# Patient Record
Sex: MALE | Race: WHITE | NOT HISPANIC OR LATINO | URBAN - METROPOLITAN AREA
[De-identification: names, ages, dates, MRNs, and addresses within clinical notes are randomized per-mention and may not be internally consistent; named-entity substitution may affect disease eponyms.]

---

## 2019-07-10 ENCOUNTER — INPATIENT (INPATIENT)
Facility: HOSPITAL | Age: 64
LOS: 0 days | Discharge: HOME | DRG: 287 | End: 2019-07-11
Attending: INTERNAL MEDICINE | Admitting: INTERNAL MEDICINE
Payer: COMMERCIAL

## 2019-07-10 ENCOUNTER — OUTPATIENT (OUTPATIENT)
Dept: OUTPATIENT SERVICES | Facility: HOSPITAL | Age: 64
LOS: 1 days | End: 2019-07-10
Payer: COMMERCIAL

## 2019-07-10 VITALS
DIASTOLIC BLOOD PRESSURE: 82 MMHG | SYSTOLIC BLOOD PRESSURE: 148 MMHG | TEMPERATURE: 98 F | HEART RATE: 61 BPM | OXYGEN SATURATION: 97 % | RESPIRATION RATE: 18 BRPM

## 2019-07-10 LAB
ALBUMIN SERPL ELPH-MCNC: 4.4 G/DL — SIGNIFICANT CHANGE UP (ref 3.3–5)
ALP SERPL-CCNC: 145 U/L — HIGH (ref 40–120)
ALT FLD-CCNC: 391 U/L — HIGH (ref 10–45)
ANION GAP SERPL CALC-SCNC: 10 MMOL/L — SIGNIFICANT CHANGE UP (ref 5–17)
APTT BLD: 30.3 SEC — SIGNIFICANT CHANGE UP (ref 27.5–36.3)
AST SERPL-CCNC: 254 U/L — HIGH (ref 10–40)
BASOPHILS # BLD AUTO: 0.02 K/UL — SIGNIFICANT CHANGE UP (ref 0–0.2)
BASOPHILS NFR BLD AUTO: 0.5 % — SIGNIFICANT CHANGE UP (ref 0–2)
BILIRUB SERPL-MCNC: 1.2 MG/DL — SIGNIFICANT CHANGE UP (ref 0.2–1.2)
BUN SERPL-MCNC: 20 MG/DL — SIGNIFICANT CHANGE UP (ref 7–23)
CALCIUM SERPL-MCNC: 9.3 MG/DL — SIGNIFICANT CHANGE UP (ref 8.4–10.5)
CHLORIDE SERPL-SCNC: 99 MMOL/L — SIGNIFICANT CHANGE UP (ref 96–108)
CK MB CFR SERPL CALC: 2.4 NG/ML — SIGNIFICANT CHANGE UP (ref 0–6.7)
CO2 SERPL-SCNC: 28 MMOL/L — SIGNIFICANT CHANGE UP (ref 22–31)
CREAT SERPL-MCNC: 1.25 MG/DL — SIGNIFICANT CHANGE UP (ref 0.5–1.3)
EOSINOPHIL # BLD AUTO: 0.15 K/UL — SIGNIFICANT CHANGE UP (ref 0–0.5)
EOSINOPHIL NFR BLD AUTO: 3.4 % — SIGNIFICANT CHANGE UP (ref 0–6)
EXTRA SST TUBE: SIGNIFICANT CHANGE UP
GLUCOSE SERPL-MCNC: 100 MG/DL — HIGH (ref 70–99)
HCT VFR BLD CALC: 42.9 % — SIGNIFICANT CHANGE UP (ref 39–50)
HGB BLD-MCNC: 14.3 G/DL — SIGNIFICANT CHANGE UP (ref 13–17)
IMM GRANULOCYTES NFR BLD AUTO: 0.2 % — SIGNIFICANT CHANGE UP (ref 0–1.5)
INR BLD: 1.1 — SIGNIFICANT CHANGE UP (ref 0.88–1.16)
LYMPHOCYTES # BLD AUTO: 1.03 K/UL — SIGNIFICANT CHANGE UP (ref 1–3.3)
LYMPHOCYTES # BLD AUTO: 23.3 % — SIGNIFICANT CHANGE UP (ref 13–44)
MCHC RBC-ENTMCNC: 32 PG — SIGNIFICANT CHANGE UP (ref 27–34)
MCHC RBC-ENTMCNC: 33.3 GM/DL — SIGNIFICANT CHANGE UP (ref 32–36)
MCV RBC AUTO: 96 FL — SIGNIFICANT CHANGE UP (ref 80–100)
MONOCYTES # BLD AUTO: 0.41 K/UL — SIGNIFICANT CHANGE UP (ref 0–0.9)
MONOCYTES NFR BLD AUTO: 9.3 % — SIGNIFICANT CHANGE UP (ref 2–14)
NEUTROPHILS # BLD AUTO: 2.81 K/UL — SIGNIFICANT CHANGE UP (ref 1.8–7.4)
NEUTROPHILS NFR BLD AUTO: 63.3 % — SIGNIFICANT CHANGE UP (ref 43–77)
NRBC # BLD: 0 /100 WBCS — SIGNIFICANT CHANGE UP (ref 0–0)
PLATELET # BLD AUTO: 171 K/UL — SIGNIFICANT CHANGE UP (ref 150–400)
POTASSIUM SERPL-MCNC: 4.1 MMOL/L — SIGNIFICANT CHANGE UP (ref 3.5–5.3)
POTASSIUM SERPL-SCNC: 4.1 MMOL/L — SIGNIFICANT CHANGE UP (ref 3.5–5.3)
PROT SERPL-MCNC: 7.4 G/DL — SIGNIFICANT CHANGE UP (ref 6–8.3)
PROTHROM AB SERPL-ACNC: 12.5 SEC — SIGNIFICANT CHANGE UP (ref 10–12.9)
RBC # BLD: 4.47 M/UL — SIGNIFICANT CHANGE UP (ref 4.2–5.8)
RBC # FLD: 12.7 % — SIGNIFICANT CHANGE UP (ref 10.3–14.5)
SODIUM SERPL-SCNC: 137 MMOL/L — SIGNIFICANT CHANGE UP (ref 135–145)
TROPONIN T SERPL-MCNC: <0.01 NG/ML — SIGNIFICANT CHANGE UP (ref 0–0.01)
WBC # BLD: 4.43 K/UL — SIGNIFICANT CHANGE UP (ref 3.8–10.5)
WBC # FLD AUTO: 4.43 K/UL — SIGNIFICANT CHANGE UP (ref 3.8–10.5)

## 2019-07-10 PROCEDURE — 93571 IV DOP VEL&/PRESS C FLO 1ST: CPT | Mod: 26,LD

## 2019-07-10 PROCEDURE — 93572 IV DOP VEL&/PRESS C FLO EA: CPT | Mod: 26,RC

## 2019-07-10 PROCEDURE — 99291 CRITICAL CARE FIRST HOUR: CPT

## 2019-07-10 PROCEDURE — 75574 CT ANGIO HRT W/3D IMAGE: CPT | Mod: 26

## 2019-07-10 PROCEDURE — 93458 L HRT ARTERY/VENTRICLE ANGIO: CPT | Mod: 26

## 2019-07-10 RX ORDER — ASPIRIN/CALCIUM CARB/MAGNESIUM 324 MG
81 TABLET ORAL DAILY
Refills: 0 | Status: DISCONTINUED | OUTPATIENT
Start: 2019-07-10 | End: 2019-07-11

## 2019-07-10 RX ORDER — SODIUM CHLORIDE 9 MG/ML
1000 INJECTION INTRAMUSCULAR; INTRAVENOUS; SUBCUTANEOUS ONCE
Refills: 0 | Status: COMPLETED | OUTPATIENT
Start: 2019-07-10 | End: 2019-07-10

## 2019-07-10 RX ORDER — COLCHICINE 0.6 MG
1 TABLET ORAL
Qty: 0 | Refills: 0 | DISCHARGE

## 2019-07-10 RX ORDER — SODIUM CHLORIDE 9 MG/ML
500 INJECTION INTRAMUSCULAR; INTRAVENOUS; SUBCUTANEOUS
Refills: 0 | Status: DISCONTINUED | OUTPATIENT
Start: 2019-07-10 | End: 2019-07-11

## 2019-07-10 RX ORDER — CLOPIDOGREL BISULFATE 75 MG/1
600 TABLET, FILM COATED ORAL ONCE
Refills: 0 | Status: DISCONTINUED | OUTPATIENT
Start: 2019-07-10 | End: 2019-07-10

## 2019-07-10 RX ORDER — COLCHICINE 0.6 MG
0.6 TABLET ORAL DAILY
Refills: 0 | Status: DISCONTINUED | OUTPATIENT
Start: 2019-07-10 | End: 2019-07-11

## 2019-07-10 RX ORDER — ASPIRIN/CALCIUM CARB/MAGNESIUM 324 MG
324 TABLET ORAL ONCE
Refills: 0 | Status: COMPLETED | OUTPATIENT
Start: 2019-07-10 | End: 2019-07-10

## 2019-07-10 RX ORDER — SODIUM CHLORIDE 9 MG/ML
500 INJECTION INTRAMUSCULAR; INTRAVENOUS; SUBCUTANEOUS
Refills: 0 | Status: DISCONTINUED | OUTPATIENT
Start: 2019-07-10 | End: 2019-07-25

## 2019-07-10 RX ORDER — ATORVASTATIN CALCIUM 80 MG/1
40 TABLET, FILM COATED ORAL AT BEDTIME
Refills: 0 | Status: DISCONTINUED | OUTPATIENT
Start: 2019-07-10 | End: 2019-07-11

## 2019-07-10 RX ORDER — LOSARTAN POTASSIUM 100 MG/1
25 TABLET, FILM COATED ORAL DAILY
Refills: 0 | Status: DISCONTINUED | OUTPATIENT
Start: 2019-07-10 | End: 2019-07-11

## 2019-07-10 RX ADMIN — Medication 0.6 MILLIGRAM(S): at 22:49

## 2019-07-10 RX ADMIN — ATORVASTATIN CALCIUM 40 MILLIGRAM(S): 80 TABLET, FILM COATED ORAL at 22:49

## 2019-07-10 RX ADMIN — SODIUM CHLORIDE 75 MILLILITER(S): 9 INJECTION INTRAMUSCULAR; INTRAVENOUS; SUBCUTANEOUS at 21:39

## 2019-07-10 RX ADMIN — Medication 324 MILLIGRAM(S): at 16:45

## 2019-07-10 RX ADMIN — SODIUM CHLORIDE 1000 MILLILITER(S): 9 INJECTION INTRAMUSCULAR; INTRAVENOUS; SUBCUTANEOUS at 16:48

## 2019-07-10 NOTE — ED ADULT NURSE NOTE - CHPI ED NUR SYMPTOMS NEG
no chills/no vomiting/no chest pain/no diaphoresis/no back pain/no nausea/no congestion/no syncope/no dizziness/no fever/no shortness of breath

## 2019-07-10 NOTE — H&P ADULT - ASSESSMENT
62yo male with PMHx of HTN, Gout who presented to [facility] ED from [facility] after an abnormal study and admitted for Cath revealing non obstructed CAD  - admit for hydration  - Start ASA/Statin  - discharge in AM.

## 2019-07-10 NOTE — ED ADULT NURSE NOTE - OBJECTIVE STATEMENT
pt arrived to ED from outpatient clinic, A&O x 3. Sent from outpatient cardiology d/t abnormal CT and EKG findings. hx HTN, Gout, bilat knee replacement. pt currently denies pain, SOB, n/v/d/ fever. NAD noted at this time. Tele monitoring applied. EKG done at triage.

## 2019-07-10 NOTE — PROGRESS NOTE ADULT - SUBJECTIVE AND OBJECTIVE BOX
PROCEDURE: CORONARY ANGIOGRAM, LHC, LV gram and IFR  INDICATION: unstable angina, positive CTA  REFERRING: Dr. Gao  ATTENDING: KENNETH  ACCESS: RRA    FINDINGS:   mid RCA: 60%  (IFR=1)   Left Main= no significant stenosis, minimally calcific  Lcx: large vessel, luminal irregularities  ostial LAD= 50% (IFR of LAD = 0.95 with wire in distal segment, measurement repeated twice)  mid-distal LAD= luminal irregularities   LVEF=65%   LVEDP= 19 mmHg    A/P  -non obstructive CAD  -cont with medical therapy

## 2019-07-10 NOTE — H&P ADULT - HISTORY OF PRESENT ILLNESS
Patient brought directly from ED to Cath lab.  History and physical exam performed after cath procedure    64yo male with PMHx of HTN, Gout who presented to [facility] ED from [facility] after an abnormal study.  Patient originally presented to dr. Parks's office with c/o two episodes of midsternal chest discomfort that awoke him from sleep.  Denies any SOB, dizziness, syncope, LE swelling, orthopnea, fevers, chills.  Patient was referred to [facility] for outpatient CTA Coronaries which revealed calcium score is severe at 633  Agatston units, which is at the 88th percentile, adjusted for age, gender and race, Severe stenosis of ostial LAD, Borderline obstructive distal LM disease, Dense calcific plaque at the ostium of D1. Significant stenosis cannot be excluded, Motion artifact in mid RCA, significant stenosis cannot be excluded, Non obstructive coronary disease elsewhere.  Patient sent from [facility] to [facility] ED given abnormal results.  He remained chest pain free.  Cardiac enzyme negative x1.  Patient admitted under Dr. Parks and brought directly to the cardiac cath lab.  He is s/p Diagnostic Cardiac Cath revealing     mid RCA: 60%  (IFR=1)   Left Main= no significant stenosis, minimally calcific  Lcx: large vessel, luminal irregularities  ostial LAD= 50% (IFR of LAD = 0.95 with wire in distal segment, measurement repeated twice)  mid-distal LAD= luminal irregularities   LVEF=65%   LVEDP= 19 mmHg Patient brought directly from ED to Cath lab.  History and physical exam performed after cath procedure    62yo male with PMHx of HTN, Gout who presented to [facility] ED from [facility] after an abnormal study.  Patient originally presented to dr. Parks's office with c/o two episodes of midsternal chest discomfort that awoke him from sleep.  Denies any SOB, dizziness, syncope, LE swelling, orthopnea, fevers, chills.  Patient was referred to [facility] for outpatient CTA Coronaries which revealed calcium score is severe at 633  Agatston units, which is at the 88th percentile, adjusted for age, gender and race, Severe stenosis of ostial LAD, Borderline obstructive distal LM disease, Dense calcific plaque at the ostium of D1. Significant stenosis cannot be excluded, Motion artifact in mid RCA, significant stenosis cannot be excluded, Non obstructive coronary disease elsewhere.  Patient sent from [facility] to [facility] ED given abnormal results.  He remained chest pain free.  Cardiac enzyme negative x1.  Patient admitted under Dr. Parks and brought directly to the cardiac cath lab.  He is s/p Diagnostic Cardiac Cath revealing non obstructed CAD (mid RCA: 60%  (IFR=1), Left Main= no significant stenosis, minimally calcific, Lcx: large vessel, luminal irregularities, ostial LAD= 50% (IFR of LAD = 0.95 with wire in distal segment, measurement repeated twice), mid-distal LAD= luminal irregularities, LVEF=65%, LVEDP= 19 mmHg.  Patient admitted post procedure for hydration given double dye load.

## 2019-07-10 NOTE — ED PROVIDER NOTE - CLINICAL SUMMARY MEDICAL DECISION MAKING FREE TEXT BOX
here w/ severe LAD stenosis in setting of 2 episodes of concerning chest pain, likely unstable angina. ekg repeated here - no STEMI. loaded w/ aspirin and plavix, for cath today

## 2019-07-10 NOTE — ED ADULT NURSE NOTE - RESPIRATORY WDL
no
Breathing spontaneous and unlabored. Breath sounds clear and equal bilaterally with regular rhythm.

## 2019-07-11 ENCOUNTER — TRANSCRIPTION ENCOUNTER (OUTPATIENT)
Age: 64
End: 2019-07-11

## 2019-07-11 VITALS — DIASTOLIC BLOOD PRESSURE: 79 MMHG | HEART RATE: 69 BPM | SYSTOLIC BLOOD PRESSURE: 129 MMHG | RESPIRATION RATE: 18 BRPM

## 2019-07-11 LAB
ALBUMIN SERPL ELPH-MCNC: 3.6 G/DL — SIGNIFICANT CHANGE UP (ref 3.3–5)
ALP SERPL-CCNC: 114 U/L — SIGNIFICANT CHANGE UP (ref 40–120)
ALT FLD-CCNC: 253 U/L — HIGH (ref 10–45)
ANION GAP SERPL CALC-SCNC: 9 MMOL/L — SIGNIFICANT CHANGE UP (ref 5–17)
AST SERPL-CCNC: 138 U/L — HIGH (ref 10–40)
BILIRUB DIRECT SERPL-MCNC: <0.2 MG/DL — SIGNIFICANT CHANGE UP (ref 0–0.2)
BILIRUB INDIRECT FLD-MCNC: >0.4 MG/DL — SIGNIFICANT CHANGE UP (ref 0.2–1)
BILIRUB SERPL-MCNC: 0.6 MG/DL — SIGNIFICANT CHANGE UP (ref 0.2–1.2)
BUN SERPL-MCNC: 16 MG/DL — SIGNIFICANT CHANGE UP (ref 7–23)
CALCIUM SERPL-MCNC: 8.7 MG/DL — SIGNIFICANT CHANGE UP (ref 8.4–10.5)
CHLORIDE SERPL-SCNC: 104 MMOL/L — SIGNIFICANT CHANGE UP (ref 96–108)
CHOLEST SERPL-MCNC: 149 MG/DL — SIGNIFICANT CHANGE UP (ref 10–199)
CO2 SERPL-SCNC: 26 MMOL/L — SIGNIFICANT CHANGE UP (ref 22–31)
CREAT SERPL-MCNC: 1.11 MG/DL — SIGNIFICANT CHANGE UP (ref 0.5–1.3)
GLUCOSE SERPL-MCNC: 87 MG/DL — SIGNIFICANT CHANGE UP (ref 70–99)
HBA1C BLD-MCNC: 5.3 % — SIGNIFICANT CHANGE UP (ref 4–5.6)
HCT VFR BLD CALC: 39 % — SIGNIFICANT CHANGE UP (ref 39–50)
HCV AB S/CO SERPL IA: 0.1 S/CO — SIGNIFICANT CHANGE UP
HCV AB SERPL-IMP: SIGNIFICANT CHANGE UP
HDLC SERPL-MCNC: 37 MG/DL — LOW
HGB BLD-MCNC: 12.8 G/DL — LOW (ref 13–17)
LIPID PNL WITH DIRECT LDL SERPL: 92 MG/DL — SIGNIFICANT CHANGE UP
MAGNESIUM SERPL-MCNC: 2.1 MG/DL — SIGNIFICANT CHANGE UP (ref 1.6–2.6)
MCHC RBC-ENTMCNC: 31.8 PG — SIGNIFICANT CHANGE UP (ref 27–34)
MCHC RBC-ENTMCNC: 32.8 GM/DL — SIGNIFICANT CHANGE UP (ref 32–36)
MCV RBC AUTO: 96.8 FL — SIGNIFICANT CHANGE UP (ref 80–100)
NRBC # BLD: 0 /100 WBCS — SIGNIFICANT CHANGE UP (ref 0–0)
PLATELET # BLD AUTO: 149 K/UL — LOW (ref 150–400)
POTASSIUM SERPL-MCNC: 4.2 MMOL/L — SIGNIFICANT CHANGE UP (ref 3.5–5.3)
POTASSIUM SERPL-SCNC: 4.2 MMOL/L — SIGNIFICANT CHANGE UP (ref 3.5–5.3)
PROT SERPL-MCNC: 6.2 G/DL — SIGNIFICANT CHANGE UP (ref 6–8.3)
RBC # BLD: 4.03 M/UL — LOW (ref 4.2–5.8)
RBC # FLD: 12.9 % — SIGNIFICANT CHANGE UP (ref 10.3–14.5)
SODIUM SERPL-SCNC: 139 MMOL/L — SIGNIFICANT CHANGE UP (ref 135–145)
TOTAL CHOLESTEROL/HDL RATIO MEASUREMENT: 4 RATIO — SIGNIFICANT CHANGE UP (ref 3.4–9.6)
TRIGL SERPL-MCNC: 102 MG/DL — SIGNIFICANT CHANGE UP (ref 10–149)
WBC # BLD: 3.35 K/UL — LOW (ref 3.8–10.5)
WBC # FLD AUTO: 3.35 K/UL — LOW (ref 3.8–10.5)

## 2019-07-11 PROCEDURE — 75574 CT ANGIO HRT W/3D IMAGE: CPT

## 2019-07-11 PROCEDURE — 76705 ECHO EXAM OF ABDOMEN: CPT | Mod: 26

## 2019-07-11 RX ORDER — LOSARTAN POTASSIUM 100 MG/1
1 TABLET, FILM COATED ORAL
Qty: 30 | Refills: 2
Start: 2019-07-11 | End: 2019-10-08

## 2019-07-11 RX ORDER — OLMESARTAN MEDOXOMIL 5 MG/1
0 TABLET, FILM COATED ORAL
Qty: 0 | Refills: 0 | DISCHARGE

## 2019-07-11 RX ORDER — ASPIRIN/CALCIUM CARB/MAGNESIUM 324 MG
1 TABLET ORAL
Qty: 0 | Refills: 0 | DISCHARGE
Start: 2019-07-11

## 2019-07-11 RX ADMIN — Medication 81 MILLIGRAM(S): at 12:16

## 2019-07-11 NOTE — DISCHARGE NOTE NURSING/CASE MANAGEMENT/SOCIAL WORK - NSDCDPATPORTLINK_GEN_ALL_CORE
You can access the [facility] Portal, offered by [facility], by registering with the following website: [email or web address]

## 2019-07-11 NOTE — PROGRESS NOTE ADULT - SUBJECTIVE AND OBJECTIVE BOX
HPI:  Patient brought directly from ED to Cath lab.  History and physical exam performed after cath procedure    64yo male with PMHx of HTN, Gout who presented to [facility] ED from [facility] after an abnormal study.  Patient originally presented to dr. Parks's office with c/o two episodes of midsternal chest discomfort that awoke him from sleep.  Denies any SOB, dizziness, syncope, LE swelling, orthopnea, fevers, chills.  Patient was referred to [facility] for outpatient CTA Coronaries which revealed calcium score is severe at 633  Agatston units, which is at the 88th percentile, adjusted for age, gender and race, Severe stenosis of ostial LAD, Borderline obstructive distal LM disease, Dense calcific plaque at the ostium of D1. Significant stenosis cannot be excluded, Motion artifact in mid RCA, significant stenosis cannot be excluded, Non obstructive coronary disease elsewhere.  Patient sent from [facility] to [facility] ED given abnormal results.  He remained chest pain free.  Cardiac enzyme negative x1.  Patient admitted under Dr. Parks and brought directly to the cardiac cath lab.  He is s/p Diagnostic Cardiac Cath revealing non obstructed CAD (mid RCA: 60%  (IFR=1), Left Main= no significant stenosis, minimally calcific, Lcx: large vessel, luminal irregularities, ostial LAD= 50% (IFR of LAD = 0.95 with wire in distal segment, measurement repeated twice), mid-distal LAD= luminal irregularities, LVEF=65%, LVEDP= 19 mmHg.  Patient admitted post procedure for hydration given double dye load. (10 Jul 2019 19:55)        MEDICATIONS  (STANDING):  aspirin enteric coated 81 milliGRAM(s) Oral daily  atorvastatin 40 milliGRAM(s) Oral at bedtime  colchicine 0.6 milliGRAM(s) Oral daily  losartan 25 milliGRAM(s) Oral daily  sodium chloride 0.9%. 500 milliLiter(s) (75 mL/Hr) IV Continuous <Continuous>        PAST MEDICAL & SURGICAL HISTORY:  No significant past surgical history        REVIEW OF SYSTEMS      General:	    Skin/Breast:  	  Ophthalmologic:  	  ENMT:	    Respiratory and Thorax:  	  Cardiovascular:	    Gastrointestinal:	    Genitourinary:	    Musculoskeletal:	    Neurological:	    Psychiatric:	    Hematology/Lymphatics:	    Endocrine:	    Allergic/Immunologic:	    FAMILY HISTORY:      Allergies    No Known Allergies    Intolerances          Vital Signs Last 24 Hrs  T(C): 36.6 (11 Jul 2019 09:14), Max: 36.9 (10 Jul 2019 16:09)  T(F): 97.8 (11 Jul 2019 09:14), Max: 98.4 (10 Jul 2019 16:09)  HR: 63 (11 Jul 2019 08:44) (48 - 63)  BP: 137/67 (11 Jul 2019 08:44) (116/67 - 148/82)  BP(mean): --  RR: 16 (11 Jul 2019 08:44) (16 - 18)  SpO2: 95% (11 Jul 2019 08:44) (95% - 97%)      PHYSICAL EXAM:      Constitutional:    Eyes:    ENMT:    Neck:    Breasts:    Back:    Respiratory:    Cardiovascular:  s1 s2  Gastrointestinal:    Genitourinary:    Rectal:    Extremities:    Vascular:    Neurological:    Skin:    Lymph Nodes:    Musculoskeletal:    Psychiatric:          CARDIAC MARKERS ( 10 Jul 2019 16:28 )  x     / <0.01 ng/mL / 98 U/L / x     / 2.4 ng/mL          PT/INR - ( 10 Jul 2019 16:28 )   PT: 12.5 sec;   INR: 1.10          PTT - ( 10 Jul 2019 16:28 )  PTT:30.3 sec      Vital Signs Last 24 Hrs  T(C): 36.6 (11 Jul 2019 09:14), Max: 36.9 (10 Jul 2019 16:09)  T(F): 97.8 (11 Jul 2019 09:14), Max: 98.4 (10 Jul 2019 16:09)  HR: 63 (11 Jul 2019 08:44) (48 - 63)  BP: 137/67 (11 Jul 2019 08:44) (116/67 - 148/82)  BP(mean): --  RR: 16 (11 Jul 2019 08:44) (16 - 18)  SpO2: 95% (11 Jul 2019 08:44) (95% - 97%)    HEALTH ISSUES - PROBLEM Dx:  cad non obstructive prox lad by ffr .95 and rca lesions medical mangement  lfts abnormalities pt had mild baseline lfts abnormalities that have risen in two days hepatology consult d/w dr merlos who referred him will have Dr shen see pt pt had ultrasound at [facility] read as normal hold statin for now

## 2019-07-11 NOTE — DISCHARGE NOTE PROVIDER - CARE PROVIDERS DIRECT ADDRESSES
,[email or web address] ,[email or web address]

## 2019-07-11 NOTE — CONSULT NOTE ADULT - ASSESSMENT
transaminitis: hep serologies negative, NAFLD  transaminases better than initial  suggest further workup as out patient

## 2019-07-11 NOTE — CONSULT NOTE ADULT - SUBJECTIVE AND OBJECTIVE BOX
62yo male with PMHx of HTN, Gout who presented to [facility] ED from [facility] after an abnormal study. Called because of LFTs.  Denies history of LFTs. Quit alcohol many years ago.  not on statins nor any herbs  3 days ago c/o epigastric pain relieved by antacids recurred again and went to [facility]  labs and sono done, Hetrpgenous liver.       REVIEW OF SYSTEMS:  Constitutional: No fever, weight loss or fatigue  ENMT:  No difficulty hearing, tinnitus, vertigo; No sinus or throat pain  Respiratory: No cough, wheezing, chills or hemoptysis  Cardiovascular: No chest pain, palpitations, dizziness or leg swelling  Gastrointestinal: No abdominal or epigastric pain. No nausea, vomiting or hematemesis; No diarrhea or constipation. No melena or hematochezia.  Skin: No itching, burning, rashes or lesions   Musculoskeletal: No joint pain or swelling; No muscle, back or extremity pain    PAST MEDICAL & SURGICAL HISTORY:  No significant past surgical history      FAMILY HISTORY:      SOCIAL HISTORY:  Smoking Status: [ ] Current, [ ] Former, [ ] Never  Pack Years:    MEDICATIONS:  MEDICATIONS  (STANDING):  aspirin enteric coated 81 milliGRAM(s) Oral daily  colchicine 0.6 milliGRAM(s) Oral daily  losartan 25 milliGRAM(s) Oral daily  sodium chloride 0.9%. 500 milliLiter(s) (75 mL/Hr) IV Continuous <Continuous>    MEDICATIONS  (PRN):      Allergies    No Known Allergies    Intolerances        Vital Signs Last 24 Hrs  T(C): 36.6 (11 Jul 2019 09:14), Max: 36.9 (10 Jul 2019 16:09)  T(F): 97.8 (11 Jul 2019 09:14), Max: 98.4 (10 Jul 2019 16:09)  HR: 57 (11 Jul 2019 08:44) (48 - 62)  BP: 137/67 (11 Jul 2019 08:44) (116/67 - 148/82)  BP(mean): --  RR: 16 (11 Jul 2019 08:44) (16 - 18)  SpO2: 95% (11 Jul 2019 08:44) (95% - 97%)    07-10 @ 07:01  -  07-11 @ 07:00  --------------------------------------------------------  IN: 375 mL / OUT: 0 mL / NET: 375 mL    07-11 @ 07:01  -  07-11 @ 12:31  --------------------------------------------------------  IN: 180 mL / OUT: 0 mL / NET: 180 mL          PHYSICAL EXAM:    General: Well developed; well nourished; in no acute distress  HEENT: MMM, conjunctiva and sclera clear  Gastrointestinal: Soft, non-tender non-distended; Normal bowel sounds; No rebound or guarding  Extremities: Normal range of motion, No clubbing, cyanosis or edema  Neurological: Alert and oriented x3  Skin: Warm and dry. No obvious rash      LABS:                        12.8   3.35  )-----------( 149      ( 11 Jul 2019 06:03 )             39.0     07-11    139  |  104  |  16  ----------------------------<  87  4.2   |  26  |  1.11    Ca    8.7      11 Jul 2019 06:03  Mg     2.1     07-11    TPro  6.2  /  Alb  3.6  /  TBili  0.6  /  DBili  <0.2  /  AST  138<H>  /  ALT  253<H>  /  AlkPhos  114  07-11          RADIOLOGY & ADDITIONAL STUDIES:

## 2019-07-11 NOTE — DISCHARGE NOTE PROVIDER - NSDCFUADDINST_GEN_ALL_CORE_FT
•	Your procedure was done through your  wrist  •	You do not need to keep this area covered and you may shower  •	Please avoid any heavy lifting  (no more than 3 to 5 lbs) or strenuous activity for five days  •	If you develop any swelling, bleeding, hardening of the skin (hematoma formation), acute pain, numbness/tingling  in your arm  please contact your doctor immediately or call our 24/7 line: [phone]

## 2019-07-11 NOTE — DISCHARGE NOTE PROVIDER - HOSPITAL COURSE
62yo male with PMHx of HTN, Gout who presented to [facility] ED 7/10/19 from [facility] after an abnormal study.  Patient originally presented to dr. Parks's office with c/o two episodes of midsternal chest discomfort that awoke him from sleep.  Patient was referred to [facility] for outpatient CTA Coronaries which revealed calcium score is severe at 633  Agatston units, which is at the 88th percentile, adjusted for age, gender and race, Severe stenosis of ostial LAD, Borderline obstructive distal LM disease, Dense calcific plaque at the ostium of D1. Significant stenosis cannot be excluded, Motion artifact in mid RCA, significant stenosis cannot be excluded, Non obstructive coronary disease elsewhere.  Patient sent from [facility] to [facility] ED given abnormal results.  He remained chest pain free.  Cardiac enzyme negative x1.  Patient admitted under Dr. Parks and brought directly to the cardiac cath lab.  He is s/p Diagnostic Cardiac Cath (7/10/19) revealing non obstructed CAD (mid RCA: 60%  (IFR=1), Left Main= no significant stenosis, minimally calcific, Lcx: large vessel, luminal irregularities, ostial LAD= 50% (IFR of LAD = 0.95 with wire in distal segment, measurement repeated twice), mid-distal LAD= luminal irregularities, LVEF=65%, LVEDP= 19 mmHg.  Patient was admitted post procedure for IV hydration in the setting of double dye load within 24 hours.         Patient seen and examined at bedside this morning. Patient with no complaints and is out of bed ambulating. Right radial access stable: no hematoma, no bleed, 2+ radial pulse. Lab values reviewed this AM and remain stable including renal function. Vital signs and telemetry remain stable overnight. Discharge medication reviewed with Dr. Parks with plan to discharge patient home on ASA 81 mg daily, Lipitor 40 mg daily (LFTs reviewed with Dr. Parks—PENDING), Losartan 25 mg daily. No BB therapy secondary to bradycardia. Patient has been cleared for discharge and will follow up with Dr. Parks in 1 week for a check up. 62yo male with PMHx of HTN, Gout who presented to [facility] ED 7/10/19 from [facility] after an abnormal study.  Patient originally presented to dr. Parks's office with c/o two episodes of midsternal chest discomfort that awoke him from sleep.  Patient was referred to [facility] for outpatient CTA Coronaries which revealed calcium score is severe at 633  Agatston units, which is at the 88th percentile, adjusted for age, gender and race, Severe stenosis of ostial LAD, Borderline obstructive distal LM disease, Dense calcific plaque at the ostium of D1. Significant stenosis cannot be excluded, Motion artifact in mid RCA, significant stenosis cannot be excluded, Non obstructive coronary disease elsewhere.  Patient sent from [facility] to [facility] ED given abnormal results.  He remained chest pain free.  Cardiac enzyme negative x1.  Patient admitted under Dr. Parks and brought directly to the cardiac cath lab.  He is s/p Diagnostic Cardiac Cath (7/10/19) revealing non obstructed CAD (mid RCA: 60%  (IFR=1), Left Main= no significant stenosis, minimally calcific, Lcx: large vessel, luminal irregularities, ostial LAD= 50% (IFR of LAD = 0.95 with wire in distal segment, measurement repeated twice), mid-distal LAD= luminal irregularities, LVEF=65%, LVEDP= 19 mmHg.  Patient was admitted post procedure for IV hydration in the setting of double dye load within 24 hours.         Patient seen and examined at bedside this morning. Patient with no complaints and is out of bed ambulating. Right radial access stable: no hematoma, no bleed, 2+ radial pulse. Lab values reviewed this AM and patient noted to have transaminitis for which Dr. Guevara was consulted. Patient had RUQ US performed at [facility] 7/8/19 due to elevated LFTs consistent with heterogenous liver. Dr. Guevara recommended repeat RUQ US today and preliminary read with no acute findings, hepatitic steotosis. Dr. Guevara recommends deferring initiation of statin therapy until LFTs normalize.     Vital signs and telemetry remain stable overnight. Discharge medication reviewed with Dr. Parks with plan to discharge patient home on ASA 81 mg daily, Lipitor 40 mg daily (LFTs reviewed with Dr. Parks—PENDING), Losartan 25 mg daily. No BB therapy secondary to bradycardia. Patient has been cleared for discharge and will follow up with Dr. Parks in 1 week for a check up. 62yo male with PMHx of HTN, Gout who presented to [facility] ED 7/10/19 from [facility] after an abnormal study.  Patient originally presented to dr. Parks's office with c/o two episodes of midsternal chest discomfort that awoke him from sleep.  Patient was referred to [facility] for outpatient CTA Coronaries which revealed calcium score is severe at 633  Agatston units, which is at the 88th percentile, adjusted for age, gender and race, Severe stenosis of ostial LAD, Borderline obstructive distal LM disease, Dense calcific plaque at the ostium of D1. Significant stenosis cannot be excluded, Motion artifact in mid RCA, significant stenosis cannot be excluded, Non obstructive coronary disease elsewhere.  Patient sent from [facility] to [facility] ED given abnormal results.  He remained chest pain free.  Cardiac enzyme negative x1.  Patient admitted under Dr. Parks and brought directly to the cardiac cath lab.  He is s/p Diagnostic Cardiac Cath (7/10/19) revealing non obstructed CAD (mid RCA: 60%  (IFR=1), Left Main= no significant stenosis, minimally calcific, Lcx: large vessel, luminal irregularities, ostial LAD= 50% (IFR of LAD = 0.95 with wire in distal segment, measurement repeated twice), mid-distal LAD= luminal irregularities, LVEF=65%, LVEDP= 19 mmHg.  Patient was admitted post procedure for IV hydration in the setting of double dye load within 24 hours.         Patient seen and examined at bedside this morning. Patient with no complaints and is out of bed ambulating. Right radial access stable: no hematoma, no bleed, 2+ radial pulse. Lab values reviewed this AM and patient noted to have transaminitis for which Dr. Guevara was consulted. Patient had RUQ US performed at [facility] 7/8/19 due to elevated LFTs consistent with heterogenous liver. Dr. Guevara recommended repeat RUQ US today and preliminary read with no acute findings, hepatitic steotosis. Dr. Guevara recommends deferring initiation of statin therapy until LFTs normalize. Patient will follow up with Dr. Guevara as an outpatient in 1-2 weeks.     Vital signs and telemetry remain stable overnight. Discharge medication reviewed with Dr. Parks with plan to discharge patient home on ASA 81 mg daily, Lipitor 40 mg daily, Losartan 25 mg daily. No statin therapy in the setting of elevated LFTs.  No BB therapy secondary to bradycardia. Patient has been cleared for discharge and will follow up with Dr. Parks in 1 week for a check up at which time repeat LFTs will be obtained.

## 2019-07-11 NOTE — DISCHARGE NOTE PROVIDER - NSDCCPCAREPLAN_GEN_ALL_CORE_FT
PRINCIPAL DISCHARGE DIAGNOSIS  Diagnosis: Coronary artery disease  Assessment and Plan of Treatment: You underwent a cardiac catheterization 7/10/19 due to an abnormal CTA Coronaries. Your cardiac catheterization revealed non obstructive Coronary Artery disease. Meaning you do have heart disease but the blockages were not significant enough to warrant fixing.  At this time, your heart disease will be managed with medication.   Please take (INSERT)  Please follow up with Dr. Parks within one week of discharge.            SECONDARY DISCHARGE DIAGNOSES  Diagnosis: Elevated liver function tests  Assessment and Plan of Treatment:     Diagnosis: Hyperlipidemia  Assessment and Plan of Treatment:     Diagnosis: Hypertension  Assessment and Plan of Treatment: PRINCIPAL DISCHARGE DIAGNOSIS  Diagnosis: Coronary artery disease  Assessment and Plan of Treatment: You underwent a cardiac catheterization 7/10/19 due to an abnormal CTA Coronaries. Your cardiac catheterization revealed non obstructive Coronary Artery disease. Meaning you do have heart disease but the blockages were not significant enough to warrant fixing.  At this time, your heart disease will be managed with medication.   Please take (INSERT)  Please follow up with Dr. Parks within one week of discharge.            SECONDARY DISCHARGE DIAGNOSES  Diagnosis: Elevated liver function tests  Assessment and Plan of Treatment: Please follow up with Dr. Daniels    Diagnosis: Hyperlipidemia  Assessment and Plan of Treatment:     Diagnosis: Hypertension  Assessment and Plan of Treatment: PRINCIPAL DISCHARGE DIAGNOSIS  Diagnosis: Coronary artery disease  Assessment and Plan of Treatment: You underwent a cardiac catheterization 7/10/19 due to an abnormal CTA Coronaries. Your cardiac catheterization revealed non obstructive Coronary Artery disease. Meaning you do have heart disease but the blockages were not significant enough to warrant fixing.  At this time, your heart disease will be managed with medication.   Please take Aspirin 81 mg daily, Losartan 25 mg daily. We did not prescribe you statin/cholesterol medication due to your abnormal liver function.   Please follow up with Dr. Parks within one week of discharge.            SECONDARY DISCHARGE DIAGNOSES  Diagnosis: Elevated liver function tests  Assessment and Plan of Treatment: You had an abdominal ultrasound completed today which did not show any acute pathology or findings.   Please follow up with Dr. Parks within a week of discharge for repeat blood work.   We recommend you follow up with Dr. Nick Guevara (Gastroenteroligst) for further work up.    Diagnosis: Hypertension  Assessment and Plan of Treatment: Please start taking Losartan 25 mg daily instead of your Benicar per Dr. Parks. Prescription has been sent to your local pharmacy.

## 2019-07-11 NOTE — DISCHARGE NOTE PROVIDER - CARE PROVIDER_API CALL
Eva Parks)  Cardiovascular Disease; Internal Medicine  [address] [place] [address]  Phone: [phone]  Fax: [phone]  Follow Up Time: 1 week Eva Parks)  Cardiovascular Disease; Internal Medicine  [address] [place] [address]  Phone: [phone]  Fax: [phone]  Follow Up Time: 1 week    Carlos Daniels)  Gastroenterology; Internal Medicine  [address] [facility], [facility] [address]  [place] [address]  Phone: [phone]  Fax: [phone]  Follow Up Time: 1 week Eva Parks)  Cardiovascular Disease; Internal Medicine  [facility] [place] [address]  Phone: [phone]  Fax: [phone]  Follow Up Time: 1 week    Luke Guevara)  Medicine  [address] , [facility] [address]  [place] [address]  Phone: [phone]  Fax: [phone]  Follow Up Time: 1 week

## 2019-07-11 NOTE — DISCHARGE NOTE PROVIDER - PROVIDER TOKENS
PROVIDER:[TOKEN:[[id number]:[1 week]] PROVIDER:[TOKEN:[[id number]],FOLLOWUP:[1 week]],PROVIDER:[TOKEN:[[id number]],FOLLOWUP:[1 week]] PROVIDER:[TOKEN:[[id number]],FOLLOWUP:[1 week]],PROVIDER:[TOKEN:[[id number]],FOLLOWUP:[1 week]]

## 2019-07-15 DIAGNOSIS — Z87.891 PERSONAL HISTORY OF NICOTINE DEPENDENCE: ICD-10-CM

## 2019-07-15 DIAGNOSIS — M10.9 GOUT, UNSPECIFIED: ICD-10-CM

## 2019-07-15 DIAGNOSIS — Z79.82 LONG TERM (CURRENT) USE OF ASPIRIN: ICD-10-CM

## 2019-07-15 DIAGNOSIS — I10 ESSENTIAL (PRIMARY) HYPERTENSION: ICD-10-CM

## 2019-07-15 DIAGNOSIS — I20.0 UNSTABLE ANGINA: ICD-10-CM

## 2019-07-15 DIAGNOSIS — I25.110 ATHEROSCLEROTIC HEART DISEASE OF NATIVE CORONARY ARTERY WITH UNSTABLE ANGINA PECTORIS: ICD-10-CM

## 2019-07-15 DIAGNOSIS — K76.0 FATTY (CHANGE OF) LIVER, NOT ELSEWHERE CLASSIFIED: ICD-10-CM

## 2019-07-29 ENCOUNTER — APPOINTMENT (OUTPATIENT)
Dept: GASTROENTEROLOGY | Facility: CLINIC | Age: 64
End: 2019-07-29
Payer: COMMERCIAL

## 2019-07-29 VITALS
SYSTOLIC BLOOD PRESSURE: 130 MMHG | OXYGEN SATURATION: 98 % | HEIGHT: 72 IN | RESPIRATION RATE: 14 BRPM | BODY MASS INDEX: 29.93 KG/M2 | WEIGHT: 221 LBS | HEART RATE: 80 BPM | DIASTOLIC BLOOD PRESSURE: 90 MMHG

## 2019-07-29 DIAGNOSIS — Z86.79 PERSONAL HISTORY OF OTHER DISEASES OF THE CIRCULATORY SYSTEM: ICD-10-CM

## 2019-07-29 DIAGNOSIS — M1A.0710 IDIOPATHIC CHRONIC GOUT, RIGHT ANKLE AND FOOT, W/OUT TOPHUS (TOPHI): ICD-10-CM

## 2019-07-29 DIAGNOSIS — Z80.0 FAMILY HISTORY OF MALIGNANT NEOPLASM OF DIGESTIVE ORGANS: ICD-10-CM

## 2019-07-29 DIAGNOSIS — R74.8 ABNORMAL LEVELS OF OTHER SERUM ENZYMES: ICD-10-CM

## 2019-07-29 DIAGNOSIS — Z78.9 OTHER SPECIFIED HEALTH STATUS: ICD-10-CM

## 2019-07-29 DIAGNOSIS — M1A.0390 IDIOPATHIC CHRONIC GOUT, UNSPECIFIED WRIST, W/OUT TOPHUS (TOPHI): ICD-10-CM

## 2019-07-29 PROCEDURE — 99204 OFFICE O/P NEW MOD 45 MIN: CPT

## 2019-07-29 RX ORDER — ASPIRIN 81 MG/1
81 TABLET, COATED ORAL
Refills: 0 | Status: ACTIVE | COMMUNITY

## 2019-07-29 RX ORDER — COLCHICINE 0.6 MG/1
TABLET ORAL
Refills: 0 | Status: ACTIVE | COMMUNITY

## 2019-07-29 NOTE — ASSESSMENT
[FreeTextEntry1] : 63yr old M with PMHx significant for HTN, CAD (non obstructive), Dyslipidemia, Gout, referred by Dr Garcia for evaluation of epigastric pain.\par \par # Abdominal pain -\par - with associated transient  LFT rise (just transaminases, no cholestatic rise) inconsistent with biliary stone disease\par - per patient his cardiologist called him and told him that the LFTs have normalized\par - USS with fatty liver and no cholelithiasis, or biliary dilation\par - planned for a EUS to evaluate for a small stone but not sure of yield since his LFT spike was just of the transaminases and not cholestasis\par - will hold off on EUS at this time\par - monitor for repeat pain - if this re-occurs then will consider getting an MRCP\par \par # Fatty liver -\par - low fat diet, supported etoh abstinence\par - continue exercise\par - monitor\par \par # Screening colonoscopy - \par - needs repeat colonoscopy next year 2020\par - plan for this and possible EGD at the same time, r/o BE intermittent GERD\par \par PRN - next year

## 2019-07-29 NOTE — HISTORY OF PRESENT ILLNESS
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 Went to see her cardiologist - and found to have some abnormal LFTs at this time, but these have since normalized.\par Nausea (+), emesis (-), abdominal pain (-), diarrhea (-), dysphagia (-), odynophagia (-), weight (intentional weight loss),\par \par Already scheduled for an upper endoscopy with Dr Mcdowell\par \par Tobacco use (-)\par Alcohol use (quit about 2yrs ago after gout dx)\par \par USS - 7/11/2019\par IMPRESSION:\par Hepatic steatosis with a 2 cm simple cyst within the right lobe of the \par liver. Otherwise unremarkable exam.\par \par Colonoscopy - 2015 - normal per patient, done in [facility] - by Dr Darryn Marcelino\par EGD - never

## 2019-07-29 NOTE — PHYSICAL EXAM
[General Appearance - Alert] : alert [General Appearance - In No Acute Distress] : in no acute distress [General Appearance - Well Nourished] : well nourished [Sclera] : the sclera and conjunctiva were normal [Neck Appearance] : the appearance of the neck was normal [Exaggerated Use Of Accessory Muscles For Inspiration] : no accessory muscle use [Heart Sounds] : normal S1 and S2 [Abdomen Soft] : soft [Bowel Sounds] : normal bowel sounds [Abdomen Tenderness] : non-tender [Abdomen Mass (___ Cm)] : no abdominal mass palpated [] : no rash [Abnormal Walk] : normal gait [Oriented To Time, Place, And Person] : oriented to person, place, and time [No Focal Deficits] : no focal deficits

## 2019-07-30 ENCOUNTER — APPOINTMENT (OUTPATIENT)
Dept: GASTROENTEROLOGY | Facility: CLINIC | Age: 64
End: 2019-07-30

## 2019-08-02 PROCEDURE — C1894: CPT

## 2019-08-02 PROCEDURE — C1887: CPT

## 2019-08-02 PROCEDURE — 80076 HEPATIC FUNCTION PANEL: CPT

## 2019-08-02 PROCEDURE — 36415 COLL VENOUS BLD VENIPUNCTURE: CPT

## 2019-08-02 PROCEDURE — 84484 ASSAY OF TROPONIN QUANT: CPT

## 2019-08-02 PROCEDURE — 85027 COMPLETE CBC AUTOMATED: CPT

## 2019-08-02 PROCEDURE — C1769: CPT

## 2019-08-02 PROCEDURE — 82550 ASSAY OF CK (CPK): CPT

## 2019-08-02 PROCEDURE — 80053 COMPREHEN METABOLIC PANEL: CPT

## 2019-08-02 PROCEDURE — 83735 ASSAY OF MAGNESIUM: CPT

## 2019-08-02 PROCEDURE — 82553 CREATINE MB FRACTION: CPT

## 2019-08-02 PROCEDURE — 80048 BASIC METABOLIC PNL TOTAL CA: CPT

## 2019-08-02 PROCEDURE — 85610 PROTHROMBIN TIME: CPT

## 2019-08-02 PROCEDURE — 76705 ECHO EXAM OF ABDOMEN: CPT

## 2019-08-02 PROCEDURE — 85730 THROMBOPLASTIN TIME PARTIAL: CPT

## 2019-08-02 PROCEDURE — 86803 HEPATITIS C AB TEST: CPT

## 2019-08-02 PROCEDURE — 80061 LIPID PANEL: CPT

## 2019-08-02 PROCEDURE — 99285 EMERGENCY DEPT VISIT HI MDM: CPT

## 2019-08-02 PROCEDURE — 85025 COMPLETE CBC W/AUTO DIFF WBC: CPT

## 2019-08-02 PROCEDURE — 83036 HEMOGLOBIN GLYCOSYLATED A1C: CPT

## 2021-04-19 NOTE — PATIENT PROFILE ADULT - ...
[10-Jul-2019 23:33:02

## 2021-06-01 ENCOUNTER — APPOINTMENT (OUTPATIENT)
Dept: HEART AND VASCULAR | Facility: CLINIC | Age: 66
End: 2021-06-01
Payer: MEDICARE

## 2021-06-01 VITALS
WEIGHT: 210 LBS | TEMPERATURE: 97.8 F | DIASTOLIC BLOOD PRESSURE: 94 MMHG | SYSTOLIC BLOOD PRESSURE: 148 MMHG | HEIGHT: 72 IN | BODY MASS INDEX: 28.44 KG/M2

## 2021-06-01 DIAGNOSIS — M10.9 GOUT, UNSPECIFIED: ICD-10-CM

## 2021-06-01 DIAGNOSIS — Z00.00 ENCOUNTER FOR GENERAL ADULT MEDICAL EXAMINATION W/OUT ABNORMAL FINDINGS: ICD-10-CM

## 2021-06-01 DIAGNOSIS — Z87.898 PERSONAL HISTORY OF OTHER SPECIFIED CONDITIONS: ICD-10-CM

## 2021-06-01 DIAGNOSIS — I25.10 ATHEROSCLEROTIC HEART DISEASE OF NATIVE CORONARY ARTERY W/OUT ANGINA PECTORIS: ICD-10-CM

## 2021-06-01 DIAGNOSIS — I10 ESSENTIAL (PRIMARY) HYPERTENSION: ICD-10-CM

## 2021-06-01 DIAGNOSIS — E78.2 MIXED HYPERLIPIDEMIA: ICD-10-CM

## 2021-06-01 PROCEDURE — 93306 TTE W/DOPPLER COMPLETE: CPT

## 2021-06-01 PROCEDURE — 93015 CV STRESS TEST SUPVJ I&R: CPT

## 2021-06-01 PROCEDURE — 36415 COLL VENOUS BLD VENIPUNCTURE: CPT

## 2021-06-01 PROCEDURE — 99214 OFFICE O/P EST MOD 30 MIN: CPT | Mod: 25

## 2021-06-01 PROCEDURE — 99072 ADDL SUPL MATRL&STAF TM PHE: CPT

## 2021-06-01 RX ORDER — ALLOPURINOL 300 MG/1
300 TABLET ORAL
Qty: 90 | Refills: 0 | Status: ACTIVE | COMMUNITY
Start: 2021-04-20

## 2021-06-01 NOTE — REASON FOR VISIT
[FreeTextEntry1] : Patient is a 65-year-old man with known coronary disease hypertension and hypercholesterolemia patient has lost weight and has been asymptomatic over the last year and a half in July 2019 a CTA suggested proximal LAD disease but on cardiac cath the FFR was 0.95 patient has mild distal left main disease 50% ostial LAD and a mid RCA 60% lesion with an IFR of 1 patient is here for evaluation

## 2021-06-01 NOTE — ASSESSMENT
[FreeTextEntry1] : Patient with hypertension hyperlipidemia and gout patient has three-vessel coronary artery disease including mild left main disease proximal LAD disease mid circumflex disease patient underwent a echocardiogram that shows normal LV function and a stress test that showed normal exercise capacity with no EKG changes and no ectopy patient is to continue on current meds pending lab reports

## 2021-06-02 LAB
ALBUMIN SERPL ELPH-MCNC: 4.4 G/DL
ALP BLD-CCNC: 89 U/L
ALT SERPL-CCNC: 19 U/L
ANION GAP SERPL CALC-SCNC: 13 MMOL/L
AST SERPL-CCNC: 29 U/L
BASOPHILS # BLD AUTO: 0.04 K/UL
BASOPHILS NFR BLD AUTO: 0.9 %
BILIRUB SERPL-MCNC: 0.5 MG/DL
BUN SERPL-MCNC: 14 MG/DL
CALCIUM SERPL-MCNC: 9.9 MG/DL
CHLORIDE SERPL-SCNC: 106 MMOL/L
CHOLEST SERPL-MCNC: 149 MG/DL
CK SERPL-CCNC: 182 U/L
CO2 SERPL-SCNC: 26 MMOL/L
CREAT SERPL-MCNC: 1.04 MG/DL
EOSINOPHIL # BLD AUTO: 0.12 K/UL
EOSINOPHIL NFR BLD AUTO: 2.6 %
ESTIMATED AVERAGE GLUCOSE: 111 MG/DL
FERRITIN SERPL-MCNC: 62 NG/ML
GLUCOSE SERPL-MCNC: 99 MG/DL
HBA1C MFR BLD HPLC: 5.5 %
HCT VFR BLD CALC: 47.7 %
HDLC SERPL-MCNC: 45 MG/DL
HGB BLD-MCNC: 15.1 G/DL
IMM GRANULOCYTES NFR BLD AUTO: 0.4 %
LDLC SERPL CALC-MCNC: 83 MG/DL
LYMPHOCYTES # BLD AUTO: 0.93 K/UL
LYMPHOCYTES NFR BLD AUTO: 20.2 %
MAN DIFF?: NORMAL
MCHC RBC-ENTMCNC: 31.7 GM/DL
MCHC RBC-ENTMCNC: 32.3 PG
MCV RBC AUTO: 101.9 FL
MONOCYTES # BLD AUTO: 0.43 K/UL
MONOCYTES NFR BLD AUTO: 9.3 %
NEUTROPHILS # BLD AUTO: 3.07 K/UL
NEUTROPHILS NFR BLD AUTO: 66.6 %
NONHDLC SERPL-MCNC: 104 MG/DL
PLATELET # BLD AUTO: 202 K/UL
POTASSIUM SERPL-SCNC: 5 MMOL/L
PROT SERPL-MCNC: 6.7 G/DL
PSA SERPL-MCNC: 0.65 NG/ML
RBC # BLD: 4.68 M/UL
RBC # FLD: 13.6 %
SODIUM SERPL-SCNC: 145 MMOL/L
TRIGL SERPL-MCNC: 106 MG/DL
TSH SERPL-ACNC: 3.02 UIU/ML
VIT B12 SERPL-MCNC: 157 PG/ML
WBC # FLD AUTO: 4.61 K/UL

## 2021-06-04 ENCOUNTER — APPOINTMENT (OUTPATIENT)
Dept: HEART AND VASCULAR | Facility: CLINIC | Age: 66
End: 2021-06-04

## 2021-06-08 ENCOUNTER — APPOINTMENT (OUTPATIENT)
Dept: HEART AND VASCULAR | Facility: CLINIC | Age: 66
End: 2021-06-08

## 2021-09-14 ENCOUNTER — APPOINTMENT (OUTPATIENT)
Dept: HEART AND VASCULAR | Facility: CLINIC | Age: 66
End: 2021-09-14
Payer: MEDICARE

## 2021-09-14 VITALS
WEIGHT: 240 LBS | DIASTOLIC BLOOD PRESSURE: 97 MMHG | BODY MASS INDEX: 44.16 KG/M2 | TEMPERATURE: 97.7 F | HEIGHT: 62 IN | SYSTOLIC BLOOD PRESSURE: 155 MMHG | HEART RATE: 88 BPM

## 2021-09-14 PROCEDURE — 99213 OFFICE O/P EST LOW 20 MIN: CPT | Mod: 25

## 2021-09-16 NOTE — ASSESSMENT
[FreeTextEntry1] : Patient's blood pressure is not controlled will increase losartan from 25-50 will probably need further adjustment patient's LDL is 75 triglycerides are elevated diet and exercise stressed

## 2021-09-16 NOTE — REASON FOR VISIT
[FreeTextEntry1] : Patient with hypertension and hyperlipidemia here for follow-up no new complaints patient is exercise

## 2021-09-17 LAB
ALBUMIN SERPL ELPH-MCNC: 4.4 G/DL
ALP BLD-CCNC: 86 U/L
ALT SERPL-CCNC: 21 U/L
ANION GAP SERPL CALC-SCNC: 14 MMOL/L
AST SERPL-CCNC: 28 U/L
BASOPHILS # BLD AUTO: 0.05 K/UL
BASOPHILS NFR BLD AUTO: 0.9 %
BILIRUB SERPL-MCNC: 0.5 MG/DL
BUN SERPL-MCNC: 23 MG/DL
CALCIUM SERPL-MCNC: 9.1 MG/DL
CHLORIDE SERPL-SCNC: 105 MMOL/L
CHOLEST SERPL-MCNC: 152 MG/DL
CK SERPL-CCNC: 185 U/L
CO2 SERPL-SCNC: 23 MMOL/L
CREAT SERPL-MCNC: 1.11 MG/DL
EOSINOPHIL # BLD AUTO: 0.15 K/UL
EOSINOPHIL NFR BLD AUTO: 2.6 %
ESTIMATED AVERAGE GLUCOSE: 114 MG/DL
FERRITIN SERPL-MCNC: 54 NG/ML
GLUCOSE SERPL-MCNC: 93 MG/DL
HBA1C MFR BLD HPLC: 5.6 %
HCT VFR BLD CALC: 46.4 %
HDLC SERPL-MCNC: 39 MG/DL
HGB BLD-MCNC: 15.1 G/DL
IMM GRANULOCYTES NFR BLD AUTO: 0.2 %
LDLC SERPL CALC-MCNC: 75 MG/DL
LYMPHOCYTES # BLD AUTO: 1.43 K/UL
LYMPHOCYTES NFR BLD AUTO: 24.8 %
MAN DIFF?: NORMAL
MCHC RBC-ENTMCNC: 31.9 PG
MCHC RBC-ENTMCNC: 32.5 GM/DL
MCV RBC AUTO: 98.1 FL
MONOCYTES # BLD AUTO: 0.65 K/UL
MONOCYTES NFR BLD AUTO: 11.3 %
NEUTROPHILS # BLD AUTO: 3.48 K/UL
NEUTROPHILS NFR BLD AUTO: 60.2 %
NONHDLC SERPL-MCNC: 113 MG/DL
PLATELET # BLD AUTO: 191 K/UL
POTASSIUM SERPL-SCNC: 4.6 MMOL/L
PROT SERPL-MCNC: 6.7 G/DL
RBC # BLD: 4.73 M/UL
RBC # FLD: 12.9 %
SODIUM SERPL-SCNC: 142 MMOL/L
TRIGL SERPL-MCNC: 186 MG/DL
TSH SERPL-ACNC: 1.95 UIU/ML
VIT B12 SERPL-MCNC: 838 PG/ML
WBC # FLD AUTO: 5.77 K/UL

## 2021-10-18 ENCOUNTER — RX RENEWAL (OUTPATIENT)
Age: 66
End: 2021-10-18

## 2021-12-11 ENCOUNTER — RX RENEWAL (OUTPATIENT)
Age: 66
End: 2021-12-11

## 2022-01-11 ENCOUNTER — RX RENEWAL (OUTPATIENT)
Age: 67
End: 2022-01-11

## 2022-08-03 ENCOUNTER — RX RENEWAL (OUTPATIENT)
Age: 67
End: 2022-08-03

## 2022-09-02 ENCOUNTER — RX RENEWAL (OUTPATIENT)
Age: 67
End: 2022-09-02

## 2022-11-02 ENCOUNTER — RX RENEWAL (OUTPATIENT)
Age: 67
End: 2022-11-02

## 2023-02-22 ENCOUNTER — RX RENEWAL (OUTPATIENT)
Age: 68
End: 2023-02-22

## 2023-05-16 ENCOUNTER — NON-APPOINTMENT (OUTPATIENT)
Age: 68
End: 2023-05-16

## 2023-05-16 ENCOUNTER — APPOINTMENT (OUTPATIENT)
Dept: HEART AND VASCULAR | Facility: CLINIC | Age: 68
End: 2023-05-16
Payer: MEDICARE

## 2023-05-16 VITALS — DIASTOLIC BLOOD PRESSURE: 83 MMHG | SYSTOLIC BLOOD PRESSURE: 129 MMHG

## 2023-05-16 VITALS
WEIGHT: 225 LBS | HEART RATE: 78 BPM | OXYGEN SATURATION: 96 % | HEIGHT: 72 IN | DIASTOLIC BLOOD PRESSURE: 76 MMHG | BODY MASS INDEX: 30.48 KG/M2 | SYSTOLIC BLOOD PRESSURE: 144 MMHG

## 2023-05-16 PROCEDURE — 93000 ELECTROCARDIOGRAM COMPLETE: CPT

## 2023-05-16 PROCEDURE — 99214 OFFICE O/P EST MOD 30 MIN: CPT | Mod: 25

## 2023-05-16 PROCEDURE — 36415 COLL VENOUS BLD VENIPUNCTURE: CPT

## 2023-05-16 NOTE — ASSESSMENT
[FreeTextEntry1] : Patient with hypertension hyperlipidemia and gout patient has three-vessel coronary artery disease including mild left main disease proximal LAD disease mid circumflex disease  patient is to continue on current meds pending lab reports\par \par pt will schedule ett/echo

## 2023-05-17 ENCOUNTER — NON-APPOINTMENT (OUTPATIENT)
Age: 68
End: 2023-05-17

## 2023-05-19 LAB
ALBUMIN SERPL ELPH-MCNC: 4.8 G/DL
ALP BLD-CCNC: 80 U/L
ALT SERPL-CCNC: 17 U/L
ANION GAP SERPL CALC-SCNC: 13 MMOL/L
AST SERPL-CCNC: 25 U/L
BASOPHILS # BLD AUTO: 0.06 K/UL
BASOPHILS NFR BLD AUTO: 1 %
BILIRUB SERPL-MCNC: 0.7 MG/DL
BUN SERPL-MCNC: 23 MG/DL
CALCIUM SERPL-MCNC: 9.6 MG/DL
CHLORIDE SERPL-SCNC: 103 MMOL/L
CHOLEST SERPL-MCNC: 172 MG/DL
CK SERPL-CCNC: 146 U/L
CO2 SERPL-SCNC: 23 MMOL/L
CREAT SERPL-MCNC: 1.07 MG/DL
EGFR: 76 ML/MIN/1.73M2
EOSINOPHIL # BLD AUTO: 0.14 K/UL
EOSINOPHIL NFR BLD AUTO: 2.3 %
ESTIMATED AVERAGE GLUCOSE: 117 MG/DL
FERRITIN SERPL-MCNC: 41 NG/ML
GLUCOSE SERPL-MCNC: 108 MG/DL
HBA1C MFR BLD HPLC: 5.7 %
HCT VFR BLD CALC: 50.7 %
HDLC SERPL-MCNC: 43 MG/DL
HGB BLD-MCNC: 16.1 G/DL
IMM GRANULOCYTES NFR BLD AUTO: 0.2 %
LDLC SERPL CALC-MCNC: 105 MG/DL
LYMPHOCYTES # BLD AUTO: 1.71 K/UL
LYMPHOCYTES NFR BLD AUTO: 28.4 %
MAN DIFF?: NORMAL
MCHC RBC-ENTMCNC: 30.9 PG
MCHC RBC-ENTMCNC: 31.8 GM/DL
MCV RBC AUTO: 97.3 FL
MONOCYTES # BLD AUTO: 0.55 K/UL
MONOCYTES NFR BLD AUTO: 9.1 %
NEUTROPHILS # BLD AUTO: 3.55 K/UL
NEUTROPHILS NFR BLD AUTO: 59 %
NONHDLC SERPL-MCNC: 129 MG/DL
PLATELET # BLD AUTO: 169 K/UL
POTASSIUM SERPL-SCNC: 4.7 MMOL/L
PROT SERPL-MCNC: 7.2 G/DL
PSA SERPL-MCNC: 0.63 NG/ML
RBC # BLD: 5.21 M/UL
RBC # FLD: 13.2 %
SODIUM SERPL-SCNC: 139 MMOL/L
TRIGL SERPL-MCNC: 119 MG/DL
TSH SERPL-ACNC: 2.41 UIU/ML
VIT B12 SERPL-MCNC: 289 PG/ML
WBC # FLD AUTO: 6.02 K/UL

## 2023-05-25 ENCOUNTER — APPOINTMENT (OUTPATIENT)
Dept: HEART AND VASCULAR | Facility: CLINIC | Age: 68
End: 2023-05-25
Payer: MEDICARE

## 2023-05-25 ENCOUNTER — LABORATORY RESULT (OUTPATIENT)
Age: 68
End: 2023-05-25

## 2023-05-25 VITALS
RESPIRATION RATE: 16 BRPM | DIASTOLIC BLOOD PRESSURE: 75 MMHG | HEIGHT: 72 IN | WEIGHT: 222 LBS | BODY MASS INDEX: 30.07 KG/M2 | SYSTOLIC BLOOD PRESSURE: 100 MMHG | HEART RATE: 95 BPM

## 2023-05-25 PROCEDURE — 99214 OFFICE O/P EST MOD 30 MIN: CPT | Mod: 25

## 2023-05-25 PROCEDURE — 93015 CV STRESS TEST SUPVJ I&R: CPT

## 2023-05-25 PROCEDURE — 36415 COLL VENOUS BLD VENIPUNCTURE: CPT

## 2023-05-25 NOTE — ASSESSMENT
[FreeTextEntry1] : Patient with hypertension hyperlipidemia and gout patient has three-vessel coronary artery disease including mild left main disease proximal LAD disease mid circumflex disease  patient is to continue on current meds pending lab reports pt had anegative ett for ischemai or arrythmia stopped secondary to target heart rate and orthopedic issues\par \par Patient is cleared for surgery with blood pressure,ekg and o2 sat monitoring pending labs which were sent today

## 2023-05-25 NOTE — REASON FOR VISIT
[FreeTextEntry1] : Patient is a 65-year-old man with known coronary disease hypertension and hypercholesterolemia patient has lost weight and has been asymptomatic over the last year and a half in July 2019 a CTA suggested proximal LAD disease but on cardiac cath the FFR was 0.95 patient has mild distal left main disease 50% ostial LAD and a mid RCA 60% lesion with an IFR of 1 patient is here for evaluation\par \par Pt was hospitalized with gallstone pancreatitis and will need a cholecystectomy

## 2023-05-26 LAB
ALBUMIN SERPL ELPH-MCNC: 3.9 G/DL
ALP BLD-CCNC: 117 U/L
ALT SERPL-CCNC: 39 U/L
ANION GAP SERPL CALC-SCNC: 15 MMOL/L
APPEARANCE: CLEAR
APTT BLD: 29.6 SEC
AST SERPL-CCNC: 26 U/L
BILIRUB SERPL-MCNC: 0.4 MG/DL
BILIRUBIN URINE: ABNORMAL
BLOOD URINE: NEGATIVE
BUN SERPL-MCNC: 21 MG/DL
CALCIUM SERPL-MCNC: 9.1 MG/DL
CHLORIDE SERPL-SCNC: 97 MMOL/L
CO2 SERPL-SCNC: 28 MMOL/L
COLOR: NORMAL
CREAT SERPL-MCNC: 1.05 MG/DL
EGFR: 77 ML/MIN/1.73M2
GLUCOSE QUALITATIVE U: NEGATIVE MG/DL
GLUCOSE SERPL-MCNC: 108 MG/DL
INR PPP: 1.29 RATIO
KETONES URINE: NEGATIVE MG/DL
LEUKOCYTE ESTERASE URINE: NEGATIVE
NITRITE URINE: NEGATIVE
PH URINE: 5.5
POTASSIUM SERPL-SCNC: 4.4 MMOL/L
PROT SERPL-MCNC: 6.7 G/DL
PROTEIN URINE: 30 MG/DL
PT BLD: 15.1 SEC
SODIUM SERPL-SCNC: 140 MMOL/L
SPECIFIC GRAVITY URINE: 1.03
UROBILINOGEN URINE: 0.2 MG/DL

## 2023-07-07 ENCOUNTER — RX RENEWAL (OUTPATIENT)
Age: 68
End: 2023-07-07

## 2023-07-19 ENCOUNTER — APPOINTMENT (OUTPATIENT)
Dept: HEART AND VASCULAR | Facility: CLINIC | Age: 68
End: 2023-07-19
Payer: MEDICARE

## 2023-07-19 PROCEDURE — 93306 TTE W/DOPPLER COMPLETE: CPT

## 2023-07-19 PROCEDURE — ZZZZZ: CPT

## 2023-07-19 PROCEDURE — 99214 OFFICE O/P EST MOD 30 MIN: CPT

## 2023-07-19 NOTE — REASON FOR VISIT
[FreeTextEntry1] : Patient is a 65-year-old man with known coronary disease hypertension and hypercholesterolemia patient has lost weight and has been asymptomatic over the last year and a half in July 2019 a CTA suggested proximal LAD disease but on cardiac cath the FFR was 0.95 patient has mild distal left main disease 50% ostial LAD and a mid RCA 60% lesion with an IFR of 1 patient is here for evaluation\par \par Pt was hospitalized with gallstone pancreatitis and s/p cholecystectomy

## 2023-07-19 NOTE — ASSESSMENT
[FreeTextEntry1] : Patient with hypertension hyperlipidemia and gout patient has three-vessel coronary artery disease including mild left main disease proximal LAD disease mid circumflex disease  patient is to continue on current meds pending lab reports pt had anegative ett for ischemai or arrythmia stopped secondary to target heart rate and orthopedic issues\par echocardiogram wnl\par \par

## 2023-10-06 ENCOUNTER — RX RENEWAL (OUTPATIENT)
Age: 68
End: 2023-10-06

## 2023-10-30 ENCOUNTER — RX RENEWAL (OUTPATIENT)
Age: 68
End: 2023-10-30

## 2023-12-31 ENCOUNTER — RX RENEWAL (OUTPATIENT)
Age: 68
End: 2023-12-31

## 2024-02-05 ENCOUNTER — RX RENEWAL (OUTPATIENT)
Age: 69
End: 2024-02-05

## 2024-05-23 ENCOUNTER — NON-APPOINTMENT (OUTPATIENT)
Age: 69
End: 2024-05-23

## 2024-05-24 RX ORDER — ROSUVASTATIN CALCIUM 5 MG/1
5 TABLET, FILM COATED ORAL
Qty: 30 | Refills: 1 | Status: ACTIVE | COMMUNITY
Start: 2024-05-24 | End: 1900-01-01

## 2024-05-24 RX ORDER — LOSARTAN POTASSIUM 50 MG/1
50 TABLET, FILM COATED ORAL
Qty: 30 | Refills: 1 | Status: ACTIVE | COMMUNITY
Start: 2024-05-24 | End: 1900-01-01

## 2024-07-27 ENCOUNTER — RX RENEWAL (OUTPATIENT)
Age: 69
End: 2024-07-27

## 2024-07-29 ENCOUNTER — RX RENEWAL (OUTPATIENT)
Age: 69
End: 2024-07-29

## 2024-08-14 ENCOUNTER — APPOINTMENT (OUTPATIENT)
Dept: HEART AND VASCULAR | Facility: CLINIC | Age: 69
End: 2024-08-14
Payer: MEDICARE

## 2024-08-14 ENCOUNTER — APPOINTMENT (OUTPATIENT)
Dept: HEART AND VASCULAR | Facility: CLINIC | Age: 69
End: 2024-08-14

## 2024-08-14 VITALS
OXYGEN SATURATION: 96 % | TEMPERATURE: 98 F | HEIGHT: 72 IN | WEIGHT: 205 LBS | SYSTOLIC BLOOD PRESSURE: 136 MMHG | HEART RATE: 67 BPM | BODY MASS INDEX: 27.77 KG/M2 | DIASTOLIC BLOOD PRESSURE: 82 MMHG

## 2024-08-14 DIAGNOSIS — E78.2 MIXED HYPERLIPIDEMIA: ICD-10-CM

## 2024-08-14 DIAGNOSIS — I25.10 ATHEROSCLEROTIC HEART DISEASE OF NATIVE CORONARY ARTERY W/OUT ANGINA PECTORIS: ICD-10-CM

## 2024-08-14 DIAGNOSIS — Z00.00 ENCOUNTER FOR GENERAL ADULT MEDICAL EXAMINATION W/OUT ABNORMAL FINDINGS: ICD-10-CM

## 2024-08-14 DIAGNOSIS — R74.8 ABNORMAL LEVELS OF OTHER SERUM ENZYMES: ICD-10-CM

## 2024-08-14 DIAGNOSIS — I10 ESSENTIAL (PRIMARY) HYPERTENSION: ICD-10-CM

## 2024-08-14 PROCEDURE — 93242 EXT ECG>48HR<7D RECORDING: CPT

## 2024-08-14 PROCEDURE — 93306 TTE W/DOPPLER COMPLETE: CPT

## 2024-08-14 PROCEDURE — 93015 CV STRESS TEST SUPVJ I&R: CPT

## 2024-08-14 PROCEDURE — ZZZZZ: CPT | Mod: NC

## 2024-08-14 PROCEDURE — 99214 OFFICE O/P EST MOD 30 MIN: CPT | Mod: 25

## 2024-08-14 NOTE — ASSESSMENT
[FreeTextEntry1] : Patient with hypertension hyperlipidemia and gout patient has three-vessel coronary artery disease including mild left main disease proximal LAD disease mid circumflex disease  patient is to continue on current meds pending lab reports pt had anegative ett for ischemai or arrythmia stopped secondary to target heart rate and orthopedic issues echocardiogram wnl No obvious etiology for syncope will obtain 7 day holter and follow up before hiking trip

## 2024-08-14 NOTE — REASON FOR VISIT
[FreeTextEntry1] : Patient is a 65-year-old man with known coronary disease hypertension and hypercholesterolemia patient has lost weight and has been asymptomatic over the last year and a half in July 2019 a CTA suggested proximal LAD disease but on cardiac cath the FFR was 0.95 patient has mild distal left main disease 50% ostial LAD and a mid RCA 60% lesion with an IFR of 1 patient is here for evaluation  Pt was hospitalized with gallstone pancreatitis and s/p cholecystectomy pt had a dislocated hip
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
## 2024-08-20 ENCOUNTER — APPOINTMENT (OUTPATIENT)
Dept: HEART AND VASCULAR | Facility: CLINIC | Age: 69
End: 2024-08-20

## 2024-08-20 LAB
ALBUMIN SERPL ELPH-MCNC: 4.2 G/DL
ALP BLD-CCNC: 85 U/L
ALT SERPL-CCNC: 22 U/L
ANION GAP SERPL CALC-SCNC: 11 MMOL/L
AST SERPL-CCNC: 23 U/L
BASOPHILS # BLD AUTO: 0.03 K/UL
BASOPHILS NFR BLD AUTO: 0.5 %
BILIRUB SERPL-MCNC: 0.6 MG/DL
BUN SERPL-MCNC: 20 MG/DL
CALCIUM SERPL-MCNC: 9.3 MG/DL
CHLORIDE SERPL-SCNC: 102 MMOL/L
CHOLEST SERPL-MCNC: 166 MG/DL
CK SERPL-CCNC: 113 U/L
CO2 SERPL-SCNC: 25 MMOL/L
CREAT SERPL-MCNC: 0.87 MG/DL
EGFR: 93 ML/MIN/1.73M2
EOSINOPHIL # BLD AUTO: 0.07 K/UL
EOSINOPHIL NFR BLD AUTO: 1.1 %
ESTIMATED AVERAGE GLUCOSE: 120 MG/DL
FERRITIN SERPL-MCNC: 61 NG/ML
GLUCOSE SERPL-MCNC: 87 MG/DL
HBA1C MFR BLD HPLC: 5.8 %
HCT VFR BLD CALC: 46.4 %
HDLC SERPL-MCNC: 49 MG/DL
HGB BLD-MCNC: 14.6 G/DL
IMM GRANULOCYTES NFR BLD AUTO: 0.2 %
LDLC SERPL CALC-MCNC: 99 MG/DL
LYMPHOCYTES # BLD AUTO: 1.01 K/UL
LYMPHOCYTES NFR BLD AUTO: 15.2 %
MAN DIFF?: NORMAL
MCHC RBC-ENTMCNC: 31.1 PG
MCHC RBC-ENTMCNC: 31.5 GM/DL
MCV RBC AUTO: 98.9 FL
MONOCYTES # BLD AUTO: 0.59 K/UL
MONOCYTES NFR BLD AUTO: 8.9 %
NEUTROPHILS # BLD AUTO: 4.92 K/UL
NEUTROPHILS NFR BLD AUTO: 74.1 %
NONHDLC SERPL-MCNC: 117 MG/DL
PLATELET # BLD AUTO: 162 K/UL
POTASSIUM SERPL-SCNC: 4.6 MMOL/L
PROT SERPL-MCNC: 6.7 G/DL
RBC # BLD: 4.69 M/UL
RBC # FLD: 13.6 %
SODIUM SERPL-SCNC: 138 MMOL/L
TRIGL SERPL-MCNC: 96 MG/DL
TSH SERPL-ACNC: 1.83 UIU/ML
VIT B12 SERPL-MCNC: 303 PG/ML
WBC # FLD AUTO: 6.63 K/UL

## 2024-09-03 ENCOUNTER — RX RENEWAL (OUTPATIENT)
Age: 69
End: 2024-09-03

## 2024-09-05 PROCEDURE — 93244 EXT ECG>48HR<7D REV&INTERPJ: CPT

## 2024-09-10 ENCOUNTER — APPOINTMENT (OUTPATIENT)
Dept: HEART AND VASCULAR | Facility: CLINIC | Age: 69
End: 2024-09-10

## 2024-09-10 VITALS
DIASTOLIC BLOOD PRESSURE: 89 MMHG | WEIGHT: 204 LBS | SYSTOLIC BLOOD PRESSURE: 137 MMHG | BODY MASS INDEX: 27.63 KG/M2 | HEIGHT: 72 IN | TEMPERATURE: 97.1 F | OXYGEN SATURATION: 96 % | HEART RATE: 89 BPM

## 2024-09-10 PROCEDURE — 96372 THER/PROPH/DIAG INJ SC/IM: CPT

## 2024-09-10 PROCEDURE — 99214 OFFICE O/P EST MOD 30 MIN: CPT

## 2024-09-10 RX ORDER — CYANOCOBALAMIN 1000 UG/ML
1000 INJECTION INTRAMUSCULAR; SUBCUTANEOUS
Qty: 0 | Refills: 0 | Status: COMPLETED | OUTPATIENT
Start: 2024-09-10

## 2024-09-10 RX ADMIN — CYANOCOBALAMINE 0 MCG/ML: 1000 INJECTION INTRAMUSCULAR; SUBCUTANEOUS at 00:00
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
## 2024-09-10 NOTE — ASSESSMENT
[FreeTextEntry1] : Patient with hypertension hyperlipidemia and gout patient has three-vessel coronary artery disease including mild left main disease proximal LAD disease mid circumflex disease  patient is to continue on current meds pending lab reports pt had a negative ett for ischemai or arrythmia stopped secondary to target heart rate and orthopedic issues echocardiogram wnl No obvious etiology for syncope on holter ett or echocardiogram b12 def 1000ug ordered today

## 2024-09-12 LAB
PSA FREE FLD-MCNC: 37 %
PSA FREE SERPL-MCNC: 0.2 NG/ML
PSA SERPL-MCNC: 0.54 NG/ML